# Patient Record
Sex: MALE | Race: WHITE | NOT HISPANIC OR LATINO | Employment: FULL TIME | URBAN - METROPOLITAN AREA
[De-identification: names, ages, dates, MRNs, and addresses within clinical notes are randomized per-mention and may not be internally consistent; named-entity substitution may affect disease eponyms.]

---

## 2023-10-09 ENCOUNTER — HOSPITAL ENCOUNTER (EMERGENCY)
Facility: HOSPITAL | Age: 68
Discharge: HOME | End: 2023-10-09
Attending: EMERGENCY MEDICINE | Admitting: EMERGENCY MEDICINE
Payer: COMMERCIAL

## 2023-10-09 ENCOUNTER — APPOINTMENT (OUTPATIENT)
Dept: GENERAL RADIOLOGY | Facility: HOSPITAL | Age: 68
End: 2023-10-09
Payer: COMMERCIAL

## 2023-10-09 VITALS
OXYGEN SATURATION: 98 % | BODY MASS INDEX: 24.38 KG/M2 | HEIGHT: 72 IN | HEART RATE: 50 BPM | RESPIRATION RATE: 18 BRPM | SYSTOLIC BLOOD PRESSURE: 126 MMHG | WEIGHT: 180 LBS | DIASTOLIC BLOOD PRESSURE: 59 MMHG | TEMPERATURE: 98 F

## 2023-10-09 DIAGNOSIS — S22.000A COMPRESSION FRACTURE OF THORACIC VERTEBRA, UNSPECIFIED THORACIC VERTEBRAL LEVEL, INITIAL ENCOUNTER: ICD-10-CM

## 2023-10-09 DIAGNOSIS — S32.010A CLOSED COMPRESSION FRACTURE OF L1 LUMBAR VERTEBRA, INITIAL ENCOUNTER: Primary | ICD-10-CM

## 2023-10-09 PROCEDURE — 73502 X-RAY EXAM HIP UNI 2-3 VIEWS: CPT

## 2023-10-09 PROCEDURE — 71046 X-RAY EXAM CHEST 2 VIEWS: CPT

## 2023-10-09 PROCEDURE — 72100 X-RAY EXAM L-S SPINE 2/3 VWS: CPT

## 2023-10-09 PROCEDURE — 72072 X-RAY EXAM THORAC SPINE 3VWS: CPT

## 2023-10-09 PROCEDURE — 99283 EMERGENCY DEPT VISIT LOW MDM: CPT

## 2023-10-09 RX ORDER — IBUPROFEN 800 MG/1
800 TABLET ORAL ONCE
Status: COMPLETED | OUTPATIENT
Start: 2023-10-09 | End: 2023-10-09

## 2023-10-09 RX ORDER — LIDOCAINE 50 MG/G
1 PATCH TOPICAL EVERY 24 HOURS
Qty: 30 PATCH | Refills: 0 | Status: SHIPPED | OUTPATIENT
Start: 2023-10-09

## 2023-10-09 RX ADMIN — IBUPROFEN 800 MG: 800 TABLET, FILM COATED ORAL at 14:11

## 2023-10-09 NOTE — ED PROVIDER NOTES
"Subjective   History of Present Illness patient is a 67-year-old male who reports he was thrown from his 100 jumper horse while going over an [place] jump yesterday afternoon.  Reports landing on his \"butt and my back hurt but I get back in the saddle\".  Patient reports going to his chiropractor this morning having x-rays with notable L4 and L5 fracture, however read studies are unavailable to review in his medical record here.  Denies loss of consciousness, denies bowel or bladder dysfunction, other focal neurodeficit, paresthesias.  Patient reports he is taking methocarbamol this morning once    Review of Systems   Constitutional:  Positive for activity change.   HENT: Negative.     Eyes: Negative.    Cardiovascular: Negative.    Gastrointestinal: Negative.    Genitourinary: Negative.    Musculoskeletal:  Positive for arthralgias, back pain, gait problem and myalgias.   Skin: Negative.    Hematological: Negative.    Psychiatric/Behavioral: Negative.         History reviewed. No pertinent past medical history.    No Known Allergies    History reviewed. No pertinent surgical history.    History reviewed. No pertinent family history.    Social History     Socioeconomic History    Marital status: Single           Objective   Physical Exam  Constitutional:       General: He is in acute distress.      Appearance: Normal appearance. He is normal weight. He is not ill-appearing.   HENT:      Head: Normocephalic.   Eyes:      Extraocular Movements: Extraocular movements intact.   Cardiovascular:      Rate and Rhythm: Normal rate and regular rhythm.      Pulses: Normal pulses.      Heart sounds: Normal heart sounds.   Pulmonary:      Effort: Pulmonary effort is normal. No respiratory distress.      Breath sounds: Normal breath sounds. No wheezing or rhonchi.      Comments: Left lateral chest wall rib pain  Chest:      Chest wall: Tenderness present.   Abdominal:      General: Abdomen is flat. Bowel sounds are normal.      " Palpations: Abdomen is soft.   Musculoskeletal:         General: Tenderness and signs of injury present.      Cervical back: Normal and normal range of motion. No deformity, signs of trauma, tenderness or bony tenderness. Normal range of motion.      Thoracic back: Signs of trauma, tenderness and bony tenderness present. No deformity. Decreased range of motion.      Lumbar back: Signs of trauma, tenderness and bony tenderness present. No deformity. Decreased range of motion.      Right lower leg: No edema.      Left lower leg: No edema.   Skin:     General: Skin is dry.      Capillary Refill: Capillary refill takes less than 2 seconds.   Neurological:      General: No focal deficit present.      Mental Status: He is alert and oriented to person, place, and time.   Psychiatric:         Mood and Affect: Mood normal.         Behavior: Behavior normal.         Procedures           ED Course   Initial evaluated in results waiting to approximately 11:20 PM     Follow-up evaluation at 3 PM, and patient discharge plans.  Results of patient's plain film imaging thoroughly explained, as well as symptomatic and supportive management at home and follow-up.  Reinforced to the patient that he should not participate in potentially dangerous or aggravating injury such as horse riding until seen by spine specialist.                                  Medical Decision Making  Patient presenting symptoms report of injury, as a higher energy trauma from falling off of his horse yesterday while galloping at high speed, primary differential includes bony deformity clinic fracture, subluxation, musculoskeletal pain versus strain, contusion, spinal stenosis, spinal cord shock, pneumothorax, pleural effusion, costochondritis.  Patient had plain film imaging of the chest, thoracic, lumbar, hip and pelvis, as well as anti-inflammatory medication administered for analgesia.  Results to be communicated disposition as appropriate or further  imaging.    Amount and/or Complexity of Data Reviewed  Radiology: ordered.    Risk  Prescription drug management.        Final diagnoses:   Closed compression fracture of L1 lumbar vertebra, initial encounter   Compression fracture of thoracic vertebra, unspecified thoracic vertebral level, initial encounter       ED Disposition  ED Disposition       ED Disposition   Discharge    Condition   Stable    Comment   --               Will Garcia MD  [address]  [address]  [place] [address]  [phone]    Call   If symptoms worsen    Yamel Wills MD  [address] [facility]  [address]  [place] [address]  [phone]      As needed         Medication List        New Prescriptions      lidocaine 5 %  Commonly known as: LIDODERM  Place 1 patch on the skin as directed by provider Daily. Remove & Discard patch within 12 hours or as directed by MD               Where to Get Your Medications        These medications were sent to [id number] - [place], [place] - [address] - [phone]  - [phone] FX  [address] [address]      Phone: [phone]   lidocaine 5 %            London Chavez, APRN  10/09/23 9441

## 2023-10-09 NOTE — DISCHARGE INSTRUCTIONS
Can take Voltaren gel available over-the-counter and apply to the area of your spine that is painful according to instructions.  You can also take ibuprofen 800 mg up to 3 times a day but do not do so for more than 2 weeks until advised by the spine specialist I referred you to or other specialist.  Continue taking your muscle relaxant that was prescribed, in addition to the Lidoderm patches which you can take apply over the painful area.